# Patient Record
(demographics unavailable — no encounter records)

---

## 2025-01-31 NOTE — SOCIAL HISTORY
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

## 2025-01-31 NOTE — PAST MEDICAL HISTORY
[Menstruating] : The patient is menstruating [Menarche Age ____] : age at menarche was [unfilled] [Definite ___ (Date)] : the last menstrual period was [unfilled] [Approximately ___] : the LMP was approximately [unfilled] [Total Preg ___] : G[unfilled] [History of Hormone Replacement Treatment] : has no history of hormone replacement treatment [FreeTextEntry5] : None [FreeTextEntry6] : No [FreeTextEntry7] : Yes, pills in past and IUD currently [FreeTextEntry8] : N/A

## 2025-01-31 NOTE — PLAN
[TextEntry] : Breast MRI now at [facility] (patient was advised to obtain CD for [facility] to bring with her to the radiology visit) If MRI returns benign and patient proceeds with genetics consult, she is cleared to proceed with reduction mammoplasty as scheduled Follow-up August 2025 Consult genetic counselor

## 2025-01-31 NOTE — HISTORY OF PRESENT ILLNESS
[FreeTextEntry1] : CHUCK is a 24 year old female, referred by Dr. Dior/Dr. John, who presents for initial evaluation regarding breast cancer risk assessment, in the setting of a family history of breast cancer.  The patient has a family history of breast cancer in her mother diagnosed at age 24/25, the patient states that she believes her mother underwent genetic testing at the time of her diagnosis with reportedly negative results.  The patient states she does not have information regarding her paternal pedigree.  The patient is pending a reduction mammoplasty on 5/13/2025 and presents for preop optimization.  Patient has no breast complaints. The patient denies any palpable abnormalities of the breast, no skin changes/dimpling, no nipple discharge bilaterally.  Of note the patient states that in 2023 her provider noted lumpy tissue in the left breast and center for a targeted left breast US which returned negative BI-RADS 1 (patient underwent this imaging at [place] and provided a picture of her results on her phone for review).   RHYS lifetime risk of 28.2%; patient's mother was diagnosed with breast cancer at age 24/25.

## 2025-01-31 NOTE — ASSESSMENT
[FreeTextEntry1] : 24-year-old female, at high risk for breast cancer, family history of breast cancer  #At high risk for breast cancer Reviewed patients lifetime RHYS risk of 28.2%. Discussed with the patient the implications for their lifetime risk, which is considered to be at elevated risk to develop breast cancer over the span of their lifetime and it is recommended that they undergo high risk screening surveillance to include biannual radiological screening exams with a mammogram and screening MRI. Reviewed NCCN guidelines with the patient that include initiation of high risk screening breast MRI's to begin starting at 10 years prior to the youngest family member affected, not prior to age 25, or to begin at age 40, whichever comes first. Reviewed initiation of screening mammogram to begin 10 years younger than the youngest family member affected, not prior to age 30, or to begin at age 40, whichever comes first. Discussed benefits of surveillance and well as implication of the sensitivity of breast MRI.  Discussed with the patient given their family history of mother affected by breast cancer age 24/25, recommendation to initiate high risk screening imaging with breast MRIs now to establish a baseline and prior to patient's planned reduction mammoplasty in May.  Discussed we will plan to initiate mammograms +/- US at the age of 30 unless clinically indicated otherwise.  Discussed patients elevated risk status and recommendations for close surveillance. Patient understands and would like to be followed closely. The patient was amenable and would like to be followed in the high-risk breast program.  Discussed breast awareness and self breast exams with the patient. Recommended simple pattern of palpation, while seated and while laying down. Recommended that she performs these breast exams at the same time every month, as to time it with her cycle. Discussed characteristics of a suspicious mass, such as irregular, hard like a rock and fixed/immobile. Patient understands.   Discussed with the patient if breast MRI returns benign and she proceeds with a genetics consult, she will be cleared to proceed with her reduction mammoplasty as scheduled.  Advised the patient to plan to follow-up in the office in August 2025 for new baseline clinical exam at which point we will plan to follow them in the high risk program.  #Family history of breast cancer Reviewed with the patient given her family history of mother diagnosed with breast cancer at 24/25 and maternal grandmother with melanoma, recommendation to meet with genetic counselor to discuss implications and indications for genetic testing.  The patient was amenable and will be referred.

## 2025-01-31 NOTE — PLAN
[TextEntry] : Breast MRI now at [facility] (patient was advised to obtain CD for [facility] to bring with her to the radiology visit) If MRI returns benign and patient proceeds with genetics consult, she is cleared to proceed with reduction mammoplasty as scheduled Follow-up August 2025 Consult genetic counselor

## 2025-01-31 NOTE — FAMILY HISTORY
[TextEntry] : Family history of breast cancer in mother at 24/25 (stage 1 ductal cancer) Maternal grandmother, melanoma The patient states they do not have a family history of ovarian cancer, colon cancer, pancreatic cancer, prostate cancer

## 2025-01-31 NOTE — SOCIAL HISTORY
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

## 2025-02-19 NOTE — DISCUSSION/SUMMARY
[FreeTextEntry1] : REASON FOR CONSULT: Radha Juares is a 24-year-old female referred by nurse practitioner, Kathy He, for cancer genetic counseling and risk assessment due to a family history of cancer. Ms. Juares was seen on 2025 at which time medical and family history was ascertained and a pedigree constructed.   RELEVANT MEDICAL HISTORY: Ms. Juares is a healthy individual with no reported history of cancer. She has a family history of cancer, see below. Of note, patient is planning a breast reduction for May 2025.  OTHER MEDICAL AND SURGICAL HISTORY: -	Medical History: no significant history reported -	Surgical History: oral surgery  OB/GYN HISTORY: H/W: 5'3", 146lbs Obstetrical History:  Age at Menarche: 11 Menopausal Status: Premenopausal  Age at First Live Birth: N/A Oral Contraceptive Use: Yes, 1 year, currently using mirena IUD Hormone Replacement Therapy: No  CANCER SCREENING HISTORY:   Breast:  -	Mammography: No -	Sonography: - reportedly wnl -	MRI: getting scheduled -	Biopsies: No GYN: -	Pelvic Examination: Annual- reportedly wnl Colon: -	Colonoscopy: No Skin:   -	FBSE: Yes -	Lesions biopsied/removed: No  SOCIAL HISTORY: -	Tobacco-product use: Yes, former -	Environmental exposures: No  FAMILY HISTORY: Maternal ancestry was reported as [place]/Guatemalan/Kosovan/Scandinavian/Mongolian/Citizen of [place] and paternal ancestry was reported as Kosovan/Kenyan/Andorran/Ashkenazi Congregational. A detailed family history of cancer was ascertained, see below and scanned chart for pedigree.   Ms. Juares reported her mother underwent BRCA1/2 testing with negative results but does not have a copy of her report and is unsure what year she had testing.  	 Of note, Ms. Juares reported she has no information on her father or other paternal relatives.   According to Ms. Juares no one else in the family has had germline testing for cancer susceptibility. Consanguinity was denied.  	 RISK ASSESSMENT: Ms. Juares's personal and family history is suggestive of a hereditary cancer syndrome given her mother's history of breast cancer at age 24. We also discussed risk assessment is limited given she has no information on paternal relatives.  The patient meets National Comprehensive Cancer Network (NCCN) criteria for genetic testing. We recommended genetic testing using [facility]'s CancerNext panel. This test analyzes 39 genes: APC, LENIN, AXIN2, BAP1, BARD1, BMPR1A, BRCA1, BRCA2, BRIP1, CDH1, CDKN2A, CHEK2, EPCAM, FH, FLCN, GREM1, HOXB13, MBD4, MET, MLH1, MSH2, MSH3, MSH6, MUTYH, NF1, NTHL1, PALB2, PMS2, POLD1, POLE, PTEN, RAD51C, RAD51D, SMAD4, STK11, TP53, TSC1, TSC2, and VHL.  The risks, benefits and limitations of genetic testing were discussed with Ms. Juares. In addition, we discussed the purpose of genetic testing and possible test results (positive, negative, inconclusive) along with associated medical management options and psychosocial implications. Insurance coverage and potential out of pocket costs were also discussed. The Genetic Information Non-discrimination Act (RAZ) was reviewed.  It was explained that risk assessment is based upon medical and family history as provided and may change in the future should new information be obtained.   Following our discussion, Ms. Juares consented to the above-mentioned genetic testing panel. Blood was drawn in our laboratory and sent to [facility] today.  PLAN:  1.	Blood drawn today will be sent to [facility] for analysis.  2.	We will contact Ms. Juares to schedule a follow-up appointment once the results are available. Results generally return in 2-3 weeks.   For any additional questions please call Cancer Genetics at [phone].    Ami Monson MS, [facility] Genetic Counselor, Cancer Genetics

## 2025-03-31 NOTE — DISCUSSION/SUMMARY
[FreeTextEntry1] : RESULTS TRANSMISSION:   Radha Juares is a 24-year-old female who was contacted on March 31, 2025 for a discussion regarding her negative genetic testing results related to hereditary cancer predisposition. This session was conducted via telephone.   Ms. Juares was originally seen by the Cancer Genetics Service on February 11, 2025 for hereditary cancer predisposition risk assessment due to a family history of cancer. At that time, Ms. Juares decided to pursue genetic testing using [facility]'s CancerNext panel.  TEST RESULTS: NEGATIVE NO pathogenic (disease-causing) variants or variants of uncertain significance were detected in any of the following genes [39]:  APC, LENIN, AXIN2, BAP1, BARD1, BMPR1A, BRCA1, BRCA2, BRIP1, CDH1, CDKN2A, CHEK2, EPCAM, FH, FLCN, GREM1, HOXB13, MBD4, MET, MLH1, MSH2, MSH3, MSH6, MUTYH, NF1, NTHL1, PALB2, PMS2, POLD1, POLE, PTEN, RAD51C, RAD51D, SMAD4, STK11, TP53, TSC1, TSC2, and VHL.  RESULTS INTERPRETATION AND ASSESSMENT: Given Ms. Juares's personal and current reported family history of cancer, and her negative genetic test results, the following screening guidelines and risk-reducing recommendations were discussed:  BREAST:  -	Given her family history of breast cancer we recommended she continue surveillance as per recommendations from her breast care team.   OTHER: -	In the absence of other indications, Ms. Juares should practice age-appropriate cancer screening of other organ systems as recommended for the general population.  We also discussed that, while no cause of the patient's personal and family history of cancer was identified, this result, while reassuring, does entirely not rule out a hereditary cancer risk in the patient. It is possible, although unlikely, the patient has a mutation in one of the genes tested that is not detectable by this analysis, or has a mutation in a different gene, either known or unknown. It is also possible there is a hereditary cancer predisposition in the family, but the patient did not inherit it.   We informed Ms. Juares that our knowledge of genetics and inherited cancer conditions is changing rapidly. Therefore, we recommended that Ms. Juares contact our office, every 2 to 3 years, to discuss relevant advances in cancer genetics.  We emphasized the importance of re-contacting us with updates regarding her personal and family history of cancer as well as any updates regarding additional cancer genetic test results performed for the patient and/or family members.  Such updates could possibly change our risk assessment and recommendations.   PLAN: 1.	These results do not change Ms. Juares's medical management.  2.	Patient informed consult note(s) will be available through their [facility] patient portal and genetic test results will be released via [facility]'s portal. 3.	Ms. Juares was encouraged to contact us every 2-3 years to discuss relevant advances in cancer genetics, or sooner if there are any changes in her personal or family history of cancer.   For any additional questions please call Cancer Genetics at [phone].    Ami Monson MS, [facility] Genetic Counselor, Cancer Genetics    
Normal
Normal